# Patient Record
Sex: MALE | Race: WHITE
[De-identification: names, ages, dates, MRNs, and addresses within clinical notes are randomized per-mention and may not be internally consistent; named-entity substitution may affect disease eponyms.]

---

## 2021-11-16 ENCOUNTER — HOSPITAL ENCOUNTER (OUTPATIENT)
Dept: HOSPITAL 61 - CT | Age: 81
End: 2021-11-16
Attending: RADIOLOGY
Payer: MEDICARE

## 2021-11-16 DIAGNOSIS — K76.9: ICD-10-CM

## 2021-11-16 DIAGNOSIS — K81.9: ICD-10-CM

## 2021-11-16 DIAGNOSIS — K44.9: ICD-10-CM

## 2021-11-16 DIAGNOSIS — K86.89: ICD-10-CM

## 2021-11-16 DIAGNOSIS — C25.0: Primary | ICD-10-CM

## 2021-11-16 DIAGNOSIS — Q25.46: ICD-10-CM

## 2021-11-16 PROCEDURE — 71260 CT THORAX DX C+: CPT

## 2021-11-16 NOTE — RAD
EXAM: Chest CT with intravenous contrast.



HISTORY: Pancreatic cancer.



TECHNIQUE: Computed tomographic images of the chest were obtained following the administration of int
ravenous contrast. Multiplanar reformatting was performed.



*One or more of the following individualized dose reduction techniques were utilized for this examina
tion:  

1. Automated exposure control.  

2. Adjustment of the mA and/or kV according to patient size.  

3. Use of iterative reconstruction technique.



COMPARISON: None.



FINDINGS: The heart is normal in size. The thoracic aorta is normal in caliber. There are tortuous ao
rtic arch great vessels. There are nonspecific mediastinal lymph nodes. For reference purposes, there
 is a high right paratracheal lymph node measuring 10 mm transaxially. There is a patulous fluid-fill
ed esophagus with slight wall thickening. There is a large hiatal hernia with intrathoracic positioni
ng of a portion of the stomach.



There are trace pleural effusions. There is no pneumothorax. There is multifocal groundglass opacity 
within the right upper lobe, likely infectious in etiology. There is posterior dependent and basilar 
atelectasis. There is a 2 mm nodule along the right minor pleural fissure, likely due to a fissural l
ymph node.



There is partial visualization of a biliary stent. This traverses a hypodense ill-defined mass within
 the proximal pancreas. There is fluid or debris within the stent. There is gallbladder wall thickeni
ng. There is pneumobilia. There is a small amount of ascites. The adrenal glands are unremarkable. Th
ere is renal cortical thinning. There are prominent peripancreatic lymph nodes. There is interpositio
n of colon anterior to the liver. There is a large amount of colonic stool. There is no suspicious os
seous lesion.



IMPRESSION: 

1. Proximal pancreatic mass consistent with known adenocarcinoma. There is surrounding fatty strandin
g and there are prominent peripancreatic lymph nodes. There is a traversing biliary stent. This can b
e better assessed with MRI/MRCP.

2. Multifocal groundglass infiltrate within the right upper lobe. Correlate for pneumonia/pneumonitis
.

3. Trace pleural effusions.

4. Patulous esophagus with wall thickening. Given the presence of a large hiatal hernia, this may be 
due to esophagitis. Correlate with symptomatology.

5. Prominent nonspecific mediastinal lymph nodes. These may be reactive in etiology.

6. Small amount of ascites.

7. Mild gallbladder wall thickening. This can be seen with intrinsic liver disease as well as cholecy
stitis.



Electronically signed by: Montse Sanon MD (11/16/2021 1:14 PM) [id number]